# Patient Record
Sex: MALE | URBAN - METROPOLITAN AREA
[De-identification: names, ages, dates, MRNs, and addresses within clinical notes are randomized per-mention and may not be internally consistent; named-entity substitution may affect disease eponyms.]

---

## 2021-08-13 ENCOUNTER — DOCUMENTATION ONLY (OUTPATIENT)
Dept: TRANSPLANT | Facility: CLINIC | Age: 36
End: 2021-08-13

## 2021-08-17 ENCOUNTER — TELEPHONE (OUTPATIENT)
Dept: TRANSPLANT | Facility: CLINIC | Age: 36
End: 2021-08-17

## 2021-08-17 NOTE — TELEPHONE ENCOUNTER
Initial Independent Living Donor Advocate contact made with potential donor today.  I introduced myself and my role during the donation process, includin.  SUMI ROLE   The federal government requires that all licensed transplant centers provide the living donor with an Independent Living Donor Advocate (SUMI).  I do not meet recipients or attend meetings that discuss their care or decision to transplant them. My role is separate to avoid any conflict of interest.  My role is to ensure:  1) your rights are protected;  2) you get all the information you need from the transplant team to make a fully informed decision whether to donate;   3) that living donation is in your best interest.   4) that you have the right to decide NOT to go forward with living donation at any time during this process.  I am available to you throughout the workup, during surgery phase and follow-up at home.   2. WORKUP & PRIVACY     Your identity and workup are not shared with the recipient at any time.     There is a medical donor workup that consists of testing to determine if you are healthy enough to donate.  Workup tests include many blood draws, urine collection/ (kidney function testing), chest x-ray, EKG, CT scan. As you complete each step then you may move on to the next.  Workup can take as little or as long as you need and you can stop the process at any time.     Transplant is a treatment option, not a cure. A kidney from a living kidney donor can last 12-14 years.  Other treatment options are  donation and two types of dialysis.     This is major surgery and your estimated hospital stay is approximately 1-2 nights.  After surgery, there are driving and lifting restrictions - no driving for two weeks and no lifting over ten pounds for 6 - 8 weeks.  Donors are routinely off from work for 4 - 6 weeks after surgery, and potentially longer if they have a physical job.       If you anticipate lost wages due to donation,  donor wage reimbursement options may be available to you and will be reviewed with you during the evaluation process.      The recipient's insurance covers the medical expenses related to the donor evaluation and surgery.  However, it is important for you to carry your own health insurance to address any medical issues that are found and are NOT related to living donation.  3.  QUESTIONS  Have you received a packet from the transplant department?  yes   Questions?    Have you discussed with anyone your potential decision to donate?  Ye, wife who is supportive  Is anyone pressuring or coercing you to donate? no  Have you discussed any financial arrangements with recipient around donating a kidney? no  Are you aware that you can confidentially opt out at any time, up to and including day of donation? yes  At this time, would you like to proceed with the medical evaluation to see if you can be a kidney donor?  ye    If yes, the donor coordinator will be reaching out to you with next steps.     You can reach me or someone else on the SUMI team by calling [phone] Option 3.    SUMI NOTES: Interested in moving forward with donor evaluation for his nephew.  [phone] is in the [employer] and recently returned from [place].  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  Has  insurance.  Telephone number in chart is incorrect.  Correct number is [phone].  Evaluation scheduled with Enrico on Thursday,  Aug. 19. 2021 at 1:30PM.    Duration of call 30 minutes

## 2021-08-19 ENCOUNTER — TELEPHONE (OUTPATIENT)
Dept: TRANSPLANT | Facility: CLINIC | Age: 36
End: 2021-08-19

## 2021-08-19 NOTE — TELEPHONE ENCOUNTER
Contacted Tino Rosario to introduce myself and my role, review of medical/surgical/family history and next steps.     Tino Rosario  is aware He can stop donor evaluation at any time.     Tino Rosario is a 36 year old male  ABO unknown that would like to learn more about donation to his nephew.     Concerns from medical/surgical/family history: Has had 3 spontaneous pnuemothorax in 2012, 2013.       Reviewed evaluation testing: Covid PCR, Iohexol, Lab work, CXR, EKG, Provider visits and functions, CT Angiogram.     Confirmed that Tino reviewed Informed consent document and all questions answered.  Reviewed that they will receive Docusign to obtain electronic signature for the following: Informed consent, SRTR data, DAYNE for medical information, Auth for Electronic communication and will need their signed consent back before proceeding with evaluation.      Encouraged sign up for MyChart and confirmed [facility] sign up.    Verified recipient status if not NDD.    Donor timeline: TBD    Instructed him at this time we have enough donors coming forward but to go ahead and send in the 3g/eplet testing kit that he received in the mail.  Instructed him that if we needed him to come in for testing we would let him know. He understood and didn't have any questions.

## 2021-08-22 ENCOUNTER — HEALTH MAINTENANCE LETTER (OUTPATIENT)
Age: 36
End: 2021-08-22

## 2021-09-07 ENCOUNTER — TELEPHONE (OUTPATIENT)
Dept: TRANSPLANT | Facility: CLINIC | Age: 36
End: 2021-09-07

## 2021-09-07 NOTE — LETTER
REIMBURSEMENT INFORMATION FOR LIVING ORGAN DONORS    LIVING ORGAN DONOR: This form MUST accompany & remain attached to Orders &  given to Provider and/or Healthcare Facility Business Office    PROVIDER/FACILITY INSTRUCTIONS: By accepting to perform these services for living organ  donation, the provider/facility agrees to exclusively bill the [facility] instead of billing  the patient or any insurance provider and agrees to accept the reimbursement, as described below, as  payment in full for services rendered.    PROVIDER BILLING INSTRUCTIONS:  1. [facility] agrees to pay for all authorized testing ordered by our transplant  program that is related to living organ donation. The attached orders/tests are part of the donor  Evaluation.    2. Do not bill the donor or donor's insurance. Send an itemized invoice, claim or statement to:    [facility]  Transplant Finance/Donor Billing  [address] [place]  [place] [address]    3. Billing statements must include the patient first and last name, date of birth, the CPT procedure code  and date of service. Please bill service on the ORIGINAL UBO4 or 1500 with appropriate CPT/HCPCS  codes along with W-9 and send to the above address to insure timely reimbursement.    4. Claims should be submitted no later than six months from the date when services are rendered.  Claims denied for late submission should not be billed to the donor or their private insurance carrier.    5. [facility] will reimburse all charges at 100% of the Medicare Fee Schedule as  defined in the Code of Federal Regulations (CFR) 42, Chapter IV. This is to be considered payment  in full. [facility], the patient, and/or the patient's insurance are NOT to  be billed any balance, co-payment, or deductible, per Medicare regulations. **ATTN: Facility  providing services for attached/enclosed Living Donor Orders; If facility does  NOT AGREE to  the reimbursement rate stated above, PLEASE DENY SERVICES & refer Donor/patient back to  their [facility] Coordinator Transplant Center.    6. Patients are NOT to make any payments at the time of service.    Please forward this information to your billing department so that a donor account can be set up with  these instructions.    Should you have any questions, please contact the Donor Billing office at [phone],  Monday - Friday, 8:00 a.m. to 4:00 p.m.   Thank you for your assistance.

## 2021-09-07 NOTE — TELEPHONE ENCOUNTER
"Called Tino to update him that his eplet testing with intended recipient came back \"low\" eplet mm risk.  We are interested in potentially evaluating him as a candidate but need more info re: his hx of spontaneous pneumothorax x3.  Tino said he had lots of testing but no definitive medical contributing factors were identified other than he says 2/3 times he was straining or lifting heavy weight.  He does not know if he has had a chest CT.  When I asked him if he was ever told he had a \"bleb\" he said \"that sounds familiar\".  Explained to Tino as he lives in [place] we would like to have him get a chest CT locally before being scheduled for a full evaluation.   I also further inquired about his back and knee pain and notation of \"meoloxicam\" in care everywhere.  Tino says the meloxicam was only just prescribed 2 weeks ago for knee pain.  He has no concerns with stopping it and avoiding NSAIDS post-donation.  He also said his back pain is not limiting.  He does not need medication and it does not effect his ability to perform ADLs.  He said his pain is normal wear and tear from working out and being in the .  I will send him orders for phase 1 testing and non-contrast chest CT.  He will connect with me once that has been completed.    Candy Villela RN, BSN, CCTN  Living Donor Coordinator  [phone]    "

## 2021-09-07 NOTE — LETTER
PHYSICIAN ORDERS      DATE & TIME ISSUED: 2021 3:27 PM  PATIENT NAME: Tino Rosario   : 1985     [facility] MR# [if applicable]: [id number]     DIAGNOSIS:  Potential Kidney Donor  ICD-10 CODE: z00.5       Non-contrast Chest CT (reason: hx of spontaneous pneumothorax)       Please see billing letter.  Do not bill patient.  Any questions please call: Candy Villela [phone]      .

## 2021-09-15 ENCOUNTER — TELEPHONE (OUTPATIENT)
Dept: TRANSPLANT | Facility: CLINIC | Age: 36
End: 2021-09-15

## 2021-09-15 NOTE — TELEPHONE ENCOUNTER
Tino called to say he had a covid exposure and had not been able to complete his chest CT, ht/wt/BPs yet.  He is currently covid negative.  He will do his remaining testing next week.  We reviewed his phase 1 labs.  High normal range urine albumin likely r/t dehydration, strenuous physical activity, and later in the day void.  Not preclusive of additional evaluation testing.  Will await chest CT, ht/wt/BPs and reconnect next week with next steps.    Candy Villela RN, BSN, CCTN  Living Donor Coordinator  [phone]

## 2021-10-17 ENCOUNTER — HEALTH MAINTENANCE LETTER (OUTPATIENT)
Age: 36
End: 2021-10-17

## 2021-11-04 ENCOUNTER — TELEPHONE (OUTPATIENT)
Dept: TRANSPLANT | Facility: CLINIC | Age: 36
End: 2021-11-04

## 2022-10-03 ENCOUNTER — HEALTH MAINTENANCE LETTER (OUTPATIENT)
Age: 37
End: 2022-10-03

## 2023-10-21 ENCOUNTER — HEALTH MAINTENANCE LETTER (OUTPATIENT)
Age: 38
End: 2023-10-21